# Patient Record
Sex: FEMALE | Race: WHITE | NOT HISPANIC OR LATINO | Employment: FULL TIME | URBAN - METROPOLITAN AREA
[De-identification: names, ages, dates, MRNs, and addresses within clinical notes are randomized per-mention and may not be internally consistent; named-entity substitution may affect disease eponyms.]

---

## 2019-01-14 ENCOUNTER — HOSPITAL ENCOUNTER (OUTPATIENT)
Dept: URGENT CARE | Facility: CLINIC | Age: 29
Discharge: HOME | End: 2019-01-14

## 2019-01-16 ENCOUNTER — CONVERSION ENCOUNTER (OUTPATIENT)
Dept: PODIATRY | Facility: CLINIC | Age: 29
End: 2019-01-16

## 2019-01-16 ENCOUNTER — OFFICE VISIT CONVERTED (OUTPATIENT)
Dept: PODIATRY | Facility: CLINIC | Age: 29
End: 2019-01-16
Attending: PODIATRIST

## 2019-08-03 ENCOUNTER — HOSPITAL ENCOUNTER (OUTPATIENT)
Dept: URGENT CARE | Facility: CLINIC | Age: 29
Discharge: HOME | End: 2019-08-03

## 2019-08-05 LAB — BACTERIA SPEC AEROBE CULT: NORMAL

## 2019-08-06 ENCOUNTER — HOSPITAL ENCOUNTER (OUTPATIENT)
Dept: PERIOP | Facility: HOSPITAL | Age: 29
Setting detail: HOSPITAL OUTPATIENT SURGERY
Discharge: HOME | End: 2019-08-06
Attending: OBSTETRICS & GYNECOLOGY

## 2019-08-06 LAB — HCG SERPL-SCNC: NEGATIVE MMOL/L

## 2020-04-02 ENCOUNTER — CONVERSION ENCOUNTER (OUTPATIENT)
Dept: FAMILY MEDICINE CLINIC | Facility: CLINIC | Age: 30
End: 2020-04-02

## 2020-04-02 ENCOUNTER — OFFICE VISIT CONVERTED (OUTPATIENT)
Dept: FAMILY MEDICINE CLINIC | Facility: CLINIC | Age: 30
End: 2020-04-02
Attending: NURSE PRACTITIONER

## 2020-04-16 ENCOUNTER — TELEPHONE CONVERTED (OUTPATIENT)
Dept: FAMILY MEDICINE CLINIC | Facility: CLINIC | Age: 30
End: 2020-04-16
Attending: NURSE PRACTITIONER

## 2020-04-27 ENCOUNTER — HOSPITAL ENCOUNTER (OUTPATIENT)
Dept: URGENT CARE | Facility: CLINIC | Age: 30
Discharge: HOME | End: 2020-04-27

## 2020-04-29 LAB — SARS-COV-2 RNA SPEC QL NAA+PROBE: NOT DETECTED

## 2020-07-14 ENCOUNTER — OFFICE VISIT CONVERTED (OUTPATIENT)
Dept: PODIATRY | Facility: CLINIC | Age: 30
End: 2020-07-14
Attending: PODIATRIST

## 2020-12-09 ENCOUNTER — HOSPITAL ENCOUNTER (OUTPATIENT)
Dept: URGENT CARE | Facility: CLINIC | Age: 30
Discharge: HOME | End: 2020-12-09
Attending: PHYSICIAN ASSISTANT

## 2020-12-10 LAB — SARS-COV-2 RNA SPEC QL NAA+PROBE: NOT DETECTED

## 2021-03-19 ENCOUNTER — HOSPITAL ENCOUNTER (OUTPATIENT)
Dept: GENERAL RADIOLOGY | Facility: HOSPITAL | Age: 31
Discharge: HOME | End: 2021-03-19
Attending: OBSTETRICS & GYNECOLOGY

## 2021-04-16 ENCOUNTER — HOSPITAL ENCOUNTER (OUTPATIENT)
Dept: PREADMISSION TESTING | Facility: HOSPITAL | Age: 31
Discharge: HOME | End: 2021-04-16
Attending: OBSTETRICS & GYNECOLOGY

## 2021-04-17 LAB — SARS-COV-2 RNA SPEC QL NAA+PROBE: NOT DETECTED

## 2021-05-09 VITALS
SYSTOLIC BLOOD PRESSURE: 120 MMHG | HEIGHT: 62 IN | DIASTOLIC BLOOD PRESSURE: 88 MMHG | OXYGEN SATURATION: 99 % | WEIGHT: 209 LBS | TEMPERATURE: 98 F | HEART RATE: 68 BPM | BODY MASS INDEX: 38.46 KG/M2

## 2021-05-13 NOTE — PROGRESS NOTES
Progress Note      Patient Name: Kristen Orellana   Patient ID: [id number]   Sex: Female   YOB: 1990    Primary Care Provider: Michael Olguin MD   Referring Provider: Johnson Jansen DPM    Visit Date: July 14, 2020    Provider: Johnson Jansen DPM   Location: [facility]   Location Address: [address] [place] [address]  [id number]   Location Phone: [phone]          Chief Complaint  · Right Foot Pain      History Of Present Illness  Kristen Orellana is a 28 year old /White female who presents to the [facility] today for f/u who is self-referred for right foot pain. The patient does not recall any specific injury to the foot. She works at [employer] and is on her feet a great deal. She has used ice and ibuprofen with some relief.      New, Established, New Problem:  est  Location:  Right heel  Duration: Two months     Onset:  Gradual  Nature:  Achy, sharp, shooting  Stable, worsening, improving:  Worsening, recurring  Aggravating factors:  Patient describes morning left heel pain as stabbing, burning, or aching. This pain usually subsides throughout the day, however it returns after periods of rest and sitting, when standing back up on their feet, and again the next morning.    Previous Treatment:  Ice and ibuprofen, cortisone injection    Patient denies any fevers, chills, nausea, vomiting, nor any other constitutional signs nor symptoms.       Past Medical History  Athletes foot; Foot pain, right; Plantar fascial fibromatosis of right foot         Past Surgical History  Deviated nasal septum repair         Medication List  phentermine 15 mg oral capsule         Allergy List  NO KNOWN DRUG ALLERGIES       Allergies Reconciled  Family Medical History  Stroke; Colon Cancer; Lung cancer         Social History  Alcohol (Current some day); Tobacco (Current every day)         Review of Systems  · Constitutional  o Denies  o :  "fever, chills, additional constitutional symptoms except as noted in the HPI  · Cardiovascular  o Denies  o : chest pain, irregular heart beats  · Gastrointestinal  o Denies  o : nausea, vomiting  · Integument  o Denies  o : hair growth change, new skin lesions  · Neurologic  o Denies  o : altered mental status, seizures  · Musculoskeletal  o Admits  o : joint pain, pain with shoe gear, pain with ambulation      Vitals  Date Time BP Position Site L\R Cuff Size HR RR TEMP (F) WT  HT  BMI kg/m2 BSA m2 O2 Sat        07/14/2020 10:20 /95 Sitting    95 - R  97.8 214lbs 4oz 5'  2\" 39.19 2.06 98 %          Physical Examination  · Constitutional  o Appearance  o : Awake, alert, well developed, well nourished and well groomed  · Cardiovascular  o Peripheral Vascular System  o :   § Pedal Pulses  § : Pedal Pulses are +2 and symmetrical   § Extremities  § : No edema of the lower extremities  · Skin and Subcutaneous Tissue  o General Inspection  o : Skin is noted to have normal texture and turgor, with no excresences noted.  o Digits and Nails  o : The tonails are noted to be without disease.  · Neurologic  o Sensation  o : Epicritic sensations intact bilaterally.  · Right Ankle/Foot  o Inspection  o : Positive tenderness to palpaiton to the medial tubercle of the calcaneus. No signs of edema, erythema, lymphangitis, nor signs of infection.   · Injection Note/Aspiration Note  o Site  o : Right heel   o Procedure  o : This patient presents for a trigger point injection. I have discussed the nature, risks, benefits, alternatives and limitations of this procedure with this patient and obtained informed consent. The area was sterilely prepped with isopropyl alcohol. A 27 gauge, 1.25 inch needle was inserted into the affected area. A sterile dressing was applied to the area.  o Medication  o : 0.5ml of 1% lidocaine plain, 0.5ml of 40mg/ml Kenalog, and 0.5ml of 4mg/ml Dexamethasone   o Disposition  o : The patient tolerated " the procedure well                Assessment  · Foot pain, right     729.5/M79.671  · Plantar fascial fibromatosis of right foot     728.71/M72.2      Plan  · Orders  o Decadron 4 mg/1cc Injection () - 729.5/M79.671, 728.71/M72.2 - 07/14/2020   Injection - Dexamethasone 4mg/mL; Dose: 2 mg; Site: Not Entered; Route: subcutaneous; Date: 07/14/2020 10:39:28; Exp: 01/31/2021; Lot: [id number]; Mfg: [employer]; TradeName: dexamethasone sodium phosphate; Location: [facility]; Administered By: Johnson Jansen DPM; Comment: ndc 89234-174-97 inj site right foot  o 2.00 - Kenalog Injection 20mg (-3) - 729.5/M79.671, 728.71/M72.2 - 07/14/2020   Injection - Kenalog 20 mg; Dose: 20mg; Site: Not Entered; Route: subcutaneous; Date: 07/14/2020 10:40:10; Exp: 11/30/2021; Lot: [id number]; Mfg: [employer].; TradeName: triamcinolone acetonide; Location: [facility]; Administered By: Johnson Jansen DPM; Comment: ndc 54334-158-52 inj site right foot  o Inj Tendon Sheath Or Ligament (30207) - 729.5/M79.671, 728.71/M72.2 - 07/14/2020  o ACO-16: BMI above normal range and follow-up plan is documented (, ) - - 07/14/2020  o ACO-17: Screened for tobacco use AND received tobacco cessation intervention (4004F, 4004F) - - 07/14/2020  · Medications  o Medications have been Reconciled  o Transition of Care or Provider Policy  · Instructions  o Handouts provided regarding Planter Fascitis.  o Overview: This patient has a plantar fasciitis.  o Discuss Findings: I have discussed the findings of this evaluation with the patient. The discussion included a complete verbal explanation of any changes in the examination results, diagnosis, and the current treatment plan. A schedule for future care needs was explained. If any questions should arise after returning home, I have encouraged the patient to feel free to contact Dr. Jansen. The patient states understanding and agreement with  this plan.  o Monitor For Problems: Pt to monitor for problems and to contact Dr. Jansen for follow-up should such signs occur. Patient states understanding and agreement with this plan.  o Spenco: Recommend OTC Spenco Orthotics.  o Treatement Alternatives: Nonsurgical treatments almost always improve the pain. Treatment can last from several months to 2 years before symptoms get better. Most patients feel better in 9 months. Rarely Some people need surgery to relieve the pain.  o Conservative Treatment Recommendations: Anti-inflammatory medication to reduce pain and inflammation, Heel stretching exercises,   o Patient request PRN follow-up.  o Patient may begin to weight bear as tolerated in supportive shoes. No impact activities for two weeks. After that time, the patient may increase activities as tolerated. Patient states understanding and agreement with this plan.   o Tobacco Cessation Counseling: Encouraged to stop smoking.   o BMI Counseling: Discussed weight loss and the need for exercise.   o Encouraged to follow-up with Primary Care Provider for preventative care.   o Discuss Findings: I have discussed the findings of this evaluation with the patient. The discussion included a complete verbal explanation of any changes in the examination results, diagnosis, and the current treatment plan. A schedule for future care needs was explained. If any questions should arise after returning home, I have encouraged the patient to feel free to contact Dr. Jansen. The patient states understanding and agreement with this plan.  o Patient is to monitor for problems and to contact Dr. Jansen for follow-up should such signs occur. Patient states understanding and agreement with this plan.   o Electronically Identified Patient Education Materials Provided Electronically  · Disposition  o Call or Return if symptoms worsen or persist.            Electronically Signed by: Johnson Jansen DPM -Author on July 14, 2020  11:15:12 AM

## 2021-05-15 VITALS
DIASTOLIC BLOOD PRESSURE: 95 MMHG | OXYGEN SATURATION: 98 % | WEIGHT: 214.25 LBS | HEART RATE: 95 BPM | SYSTOLIC BLOOD PRESSURE: 123 MMHG | BODY MASS INDEX: 39.43 KG/M2 | HEIGHT: 62 IN | TEMPERATURE: 97.8 F

## 2021-05-15 VITALS
HEIGHT: 62 IN | SYSTOLIC BLOOD PRESSURE: 120 MMHG | DIASTOLIC BLOOD PRESSURE: 85 MMHG | WEIGHT: 205 LBS | BODY MASS INDEX: 37.73 KG/M2 | OXYGEN SATURATION: 96 % | HEART RATE: 74 BPM

## 2021-11-19 ENCOUNTER — OFFICE VISIT (OUTPATIENT)
Dept: PODIATRY | Facility: CLINIC | Age: 31
End: 2021-11-19

## 2021-11-19 VITALS
BODY MASS INDEX: 39.75 KG/M2 | TEMPERATURE: 97.6 F | OXYGEN SATURATION: 96 % | HEIGHT: 62 IN | DIASTOLIC BLOOD PRESSURE: 70 MMHG | WEIGHT: 216 LBS | SYSTOLIC BLOOD PRESSURE: 110 MMHG | HEART RATE: 79 BPM

## 2021-11-19 DIAGNOSIS — M79.671 FOOT PAIN, BILATERAL: Primary | ICD-10-CM

## 2021-11-19 DIAGNOSIS — M79.672 FOOT PAIN, BILATERAL: Primary | ICD-10-CM

## 2021-11-19 DIAGNOSIS — M72.2 PLANTAR FASCIITIS: ICD-10-CM

## 2021-11-19 PROCEDURE — 99213 OFFICE O/P EST LOW 20 MIN: CPT | Performed by: PODIATRIST

## 2021-11-19 PROCEDURE — 20550 NJX 1 TENDON SHEATH/LIGAMENT: CPT | Performed by: PODIATRIST

## 2021-11-19 RX ORDER — TRIAMCINOLONE ACETONIDE 40 MG/ML
20 INJECTION, SUSPENSION INTRA-ARTICULAR; INTRAMUSCULAR ONCE
Status: DISCONTINUED | OUTPATIENT
Start: 2021-11-19 | End: 2022-04-19

## 2021-11-19 RX ORDER — TRIAMCINOLONE ACETONIDE 40 MG/ML
20 INJECTION, SUSPENSION INTRA-ARTICULAR; INTRAMUSCULAR ONCE
Status: COMPLETED | OUTPATIENT
Start: 2021-11-19 | End: 2021-11-19

## 2021-11-19 RX ORDER — LIDOCAINE HYDROCHLORIDE 10 MG/ML
0.5 INJECTION, SOLUTION INFILTRATION; PERINEURAL ONCE
Status: DISCONTINUED | OUTPATIENT
Start: 2021-11-19 | End: 2022-04-19

## 2021-11-19 RX ORDER — DEXAMETHASONE SODIUM PHOSPHATE 4 MG/ML
2 INJECTION, SOLUTION INTRA-ARTICULAR; INTRALESIONAL; INTRAMUSCULAR; INTRAVENOUS; SOFT TISSUE ONCE
Status: DISCONTINUED | OUTPATIENT
Start: 2021-11-19 | End: 2022-04-19

## 2021-11-19 RX ORDER — DEXAMETHASONE SODIUM PHOSPHATE 4 MG/ML
2 INJECTION, SOLUTION INTRA-ARTICULAR; INTRALESIONAL; INTRAMUSCULAR; INTRAVENOUS; SOFT TISSUE ONCE
Status: COMPLETED | OUTPATIENT
Start: 2021-11-19 | End: 2021-11-19

## 2021-11-19 RX ORDER — LIDOCAINE HYDROCHLORIDE 10 MG/ML
0.5 INJECTION, SOLUTION INFILTRATION; PERINEURAL ONCE
Status: COMPLETED | OUTPATIENT
Start: 2021-11-19 | End: 2021-11-19

## 2021-11-19 RX ADMIN — LIDOCAINE HYDROCHLORIDE 0.5 ML: 10 INJECTION, SOLUTION INFILTRATION; PERINEURAL at 09:24

## 2021-11-19 RX ADMIN — TRIAMCINOLONE ACETONIDE 20 MG: 40 INJECTION, SUSPENSION INTRA-ARTICULAR; INTRAMUSCULAR at 09:22

## 2021-11-19 RX ADMIN — DEXAMETHASONE SODIUM PHOSPHATE 2 MG: 4 INJECTION, SOLUTION INTRA-ARTICULAR; INTRALESIONAL; INTRAMUSCULAR; INTRAVENOUS; SOFT TISSUE at 09:21

## 2021-11-19 RX ADMIN — DEXAMETHASONE SODIUM PHOSPHATE 2 MG: 4 INJECTION, SOLUTION INTRA-ARTICULAR; INTRALESIONAL; INTRAMUSCULAR; INTRAVENOUS; SOFT TISSUE at 09:22

## 2021-11-19 RX ADMIN — TRIAMCINOLONE ACETONIDE 20 MG: 40 INJECTION, SUSPENSION INTRA-ARTICULAR; INTRAMUSCULAR at 09:23

## 2021-11-19 NOTE — PROGRESS NOTES
[facility] - P[facility]IATRY    Today's Date: 21    Patient Name: Kristen Orellana  MRN: [id number]  CSN: [id number]  PCP: Provider, No Known  Referring Provider: Referring, Self    SUBJECTIVE     Chief Complaint   Patient presents with   • Left Foot - Pain   • Right Foot - Pain     HPI: Kristen Orellana, a 31 y.o.female, comes to clinic.    New, Established, New Problem: Established on right heel and new problem left heel    Location: Plantar heel bilaterally    Duration: Summer 2021    Onset:  gradual    Nature:  achy, sharp, shooting    Stable, worsening, improving: Worsening    Aggravating factors:      Patient describes morning bilateral heel pain as stabbing, burning, or aching. This pain usually subsides throughout the day, however it returns after periods of rest and sitting, when standing back up on their feet, and again the next morning.      Previous Treatment: Previous cortisone injections on right heel    Patient denies any fevers, chills, nausea, vomiting, shortness of breath, nor any other constitutional signs nor symptoms.    No other pedal complaints at this time.    Past Medical History:   Diagnosis Date   • Foot pain, bilateral      Past Surgical History:   Procedure Laterality Date   •  SECTION     • ENDOMETRIAL ABLATION     • HYSTERECTOMY     • NOSE SURGERY       Family History   Problem Relation Age of Onset   • Cancer Mother         breast   • Stroke Maternal Grandmother    • Cancer Maternal Grandmother         lung, breast   • Cancer Maternal Grandfather         lung   • Diabetes Paternal Grandmother    • Stroke Paternal Grandmother    • Cancer Paternal Grandmother         colon   • Stroke Paternal Grandfather    • Heart disease Neg Hx      Social History     Socioeconomic History   • Marital status:    Tobacco Use   • Smoking status: Current Every Day Smoker     Packs/day: 0.50     Types: Cigarettes   • Smokeless tobacco: Never Used   Vaping Use   •  Vaping Use: Never used   Substance and Sexual Activity   • Alcohol use: Yes     Comment: occas   • Drug use: Never   • Sexual activity: Defer     Allergies   Allergen Reactions   • Norgestimate-Eth Estradiol Hives     FEVER, THROAT SWELL     No current outpatient medications on file.     Current Facility-Administered Medications   Medication Dose Route Frequency Provider Last Rate Last Admin   • dexamethasone sodium phosphate injection 2 mg  2 mg Intramuscular Once Johnson Jansen DPM       • dexamethasone sodium phosphate injection 2 mg  2 mg Intramuscular Once Jhonson Jansen DPM       • dexamethasone sodium phosphate injection 2 mg  2 mg Intramuscular Once Johnson Jansen DPM       • dexamethasone sodium phosphate injection 2 mg  2 mg Intramuscular Once Johnson Jansen DPM       • lidocaine (XYLOCAINE) 1 % injection 0.5 mL  0.5 mL Infiltration Once Johnson Jansen DPM       • lidocaine (XYLOCAINE) 1 % injection 0.5 mL  0.5 mL Infiltration Once Johnson Jansen DPM       • lidocaine (XYLOCAINE) 1 % injection 0.5 mL  0.5 mL Infiltration Once Johnson Jansen DPM       • lidocaine (XYLOCAINE) 1 % injection 0.5 mL  0.5 mL Infiltration Once Johnson Jansen DPM       • triamcinolone acetonide (KENALOG-40) injection 20 mg  20 mg Intramuscular Once Johnson Jansen DPM       • triamcinolone acetonide (KENALOG-40) injection 20 mg  20 mg Intramuscular Once Johnson Jansen DPM       • triamcinolone acetonide (KENALOG-40) injection 20 mg  20 mg Intramuscular Once Johnson Jansen DPM       • triamcinolone acetonide (KENALOG-40) injection 20 mg  20 mg Intramuscular Once Johnson Jansen DPM         Review of Systems   Constitutional: Negative.    Musculoskeletal:        Bilateral heel pain   All other systems reviewed and are negative.      OBJECTIVE     Vitals:    11/19/21 0801   BP: 110/70   Pulse: 79   Temp: 97.6 °F (36.4 °C)   SpO2:  96%       PHYSICAL EXAM     Foot/Ankle Exam:       General:   Appearance: obesity    Orientation: AAOx3    Affect: appropriate    Gait: unimpaired      VASCULAR      Right Foot Vascularity   Normal vascular exam    Dorsalis pedis:  2+  Posterior tibial:  2+  Skin Temperature: warm    Edema Grading:  None  CFT:  < 3 seconds  Pedal Hair Growth:  Present  Varicosities: none       Left Foot Vascularity   Normal vascular exam    Dorsalis pedis:  2+  Posterior tibial:  2+  Skin Temperature: warm    Edema Grading:  None  CFT:  < 3 seconds  Pedal Hair Growth:  Present  Varicosities: none        NEUROLOGIC     Right Foot Neurologic   Normal sensation    Light touch sensation:  Normal  Vibratory sensation:  Normal  Hot/Cold sensation: normal       Left Foot Neurologic   Normal sensation    Light touch sensation:  Normal  Vibratory sensation:  Normal  Hot/cold sensation: normal       MUSCULOSKELETAL      Right Foot Musculoskeletal   Tenderness: plantar fascia and plantar heel       Left Foot Musculoskeletal   Tenderness: plantar fascia and plantar heel    Tailor's bunion: Yes       MUSCLE STRENGTH     Right Foot Muscle Strength   Normal strength    Foot dorsiflexion:  5  Foot plantar flexion:  5  Foot inversion:  5  Foot eversion:  5     Left Foot Muscle Strength   Foot dorsiflexion:  5  Foot plantar flexion:  5  Foot inversion:  5  Foot eversion:  5     RANGE OF MOTION      Right Foot Range of Motion   Foot and ankle ROM within normal limits       Left Foot Range of Motion   Foot and ankle ROM within normal limits       DERMATOLOGIC     Right Foot Dermatologic   Skin: skin intact    Nails: normal       Left Foot Dermatologic   Skin: skin intact    Nails: normal        RADIOLOGY:    XR Foot 3+ View Left    Result Date: 11/19/2021  Narrative: IN-OFFICE IMAGING:  3 view, AP, [place], Lateral, left foot Indication: Heel pain Findings: Posterior and inferior calcaneal enthesopathy.  Tailor's bunion.  Anterior cyma line break seen.  No  "periosteal reactions nor osteolytic changes seen.  No occult fractures seen. Comparison: No comparison views available.        ASSESSMENT/PLAN     Diagnoses and all orders for this visit:    1. Foot pain, bilateral (Primary)    2. Plantar fasciitis  -     triamcinolone acetonide (KENALOG-40) injection 20 mg  -     lidocaine (XYLOCAINE) 1 % injection 0.5 mL  -     dexamethasone sodium phosphate injection 2 mg  -     dexamethasone sodium phosphate injection 2 mg  -     lidocaine (XYLOCAINE) 1 % injection 0.5 mL  -     triamcinolone acetonide (KENALOG-40) injection 20 mg    Comprehensive lower extremity examination and evaluation was performed.    Discussed findings and treatment plan including risks, benefits, and treatment options with patient in detail. Patient agreed with treatment plan.    Plantar Fasciits Injection:    Date/Time: 11/19/2021  Performed by: Johnson Jansen DPM  Authorized by: Johnson Jansen DPM     Consent: Verbal consent obtained. Written consent obtained.  Risks and benefits: risks, benefits and alternatives were discussed  Consent given by: patient  Patient identity confirmed: verbally with patient  Indications: pain relief    Injection site: Bilateral heel.    Sedation:  none    Patient position: sitting  Needle size: 27 G, 1 1/4\" in length  Injection medications:  0.5 ml 1% Lidocaine plain, 0.5 ml Kenalog 40 mg/ml, and 0.5 ml Decadron 4 mg/mL.   Outcome: pain improved    Patient tolerated the procedure well with no immediate complications.    Treatment options discussed included: No treatment at all, cortisone injections, NSAIDs, p.o. medications, change in shoe gear, arch supports, RICE therapy.    Dr. Jansen recommended purchase and use of OTC Spenco Orthotics.  The patient states understanding and agreement with this plan.    Discussed proper shoegear for the patient's feet and medical condition.  Patient states understanding and agreement with this plan.    Rice Therapy: It is " important to treat any injury as soon as possible to help control swelling and increase recovery time. The recognized regimen for immediate treatment of sport injuries includes rest, ice (cold application), compression, and elevation (RICE). Remove the injured athlete from play, apply ice to the affected area, wrap or compress the injured area with an elastic bandage when appropriate, and elevate the injured area above heart level to reduce swelling.  The patient is to not use ice for longer than 20 minutes at a time, with at least 20 minutes of no ice usage between applications.  The patient states understanding and agreement with this plan.    Patient may begin to weight bear as tolerated in supportive shoes.  No impact activities for two weeks.  After that time, the patient may increase activities as tolerated. Patient states understanding and agreement with this plan.    An After Visit Summary was printed and given to the patient at discharge, including (if requested) any available informative/educational handouts regarding diagnosis, treatment, or medications. All questions were answered to patient/family satisfaction. Should symptoms fail to improve or worsen they agree to call or return to clinic or to go to the Emergency Department. Discussed the importance of following up with any needed screening tests/labs/specialist appointments and any requested follow-up recommended by me today. Importance of maintaining follow-up discussed and patient accepts that missed appointments can delay diagnosis and potentially lead to worsening of conditions.    Return if symptoms worsen or fail to improve., or sooner if acute issues arise.    This document has been electronically signed by Johnson Jansen DPM on November 19, 2021 09:14 EST

## 2022-04-19 ENCOUNTER — OFFICE VISIT (OUTPATIENT)
Dept: PODIATRY | Facility: CLINIC | Age: 32
End: 2022-04-19

## 2022-04-19 VITALS
BODY MASS INDEX: 40.67 KG/M2 | HEART RATE: 85 BPM | SYSTOLIC BLOOD PRESSURE: 106 MMHG | HEIGHT: 62 IN | OXYGEN SATURATION: 100 % | WEIGHT: 221 LBS | TEMPERATURE: 97.5 F | DIASTOLIC BLOOD PRESSURE: 77 MMHG

## 2022-04-19 DIAGNOSIS — M79.671 FOOT PAIN, BILATERAL: Primary | ICD-10-CM

## 2022-04-19 DIAGNOSIS — M79.672 FOOT PAIN, BILATERAL: Primary | ICD-10-CM

## 2022-04-19 DIAGNOSIS — M72.2 PLANTAR FASCIITIS: ICD-10-CM

## 2022-04-19 PROCEDURE — 20550 NJX 1 TENDON SHEATH/LIGAMENT: CPT | Performed by: PODIATRIST

## 2022-04-19 PROCEDURE — 99213 OFFICE O/P EST LOW 20 MIN: CPT | Performed by: PODIATRIST

## 2022-04-19 RX ORDER — DEXAMETHASONE SODIUM PHOSPHATE 4 MG/ML
2 INJECTION, SOLUTION INTRA-ARTICULAR; INTRALESIONAL; INTRAMUSCULAR; INTRAVENOUS; SOFT TISSUE ONCE
Status: COMPLETED | OUTPATIENT
Start: 2022-04-19 | End: 2022-04-19

## 2022-04-19 RX ORDER — TRIAMCINOLONE ACETONIDE 40 MG/ML
20 INJECTION, SUSPENSION INTRA-ARTICULAR; INTRAMUSCULAR ONCE
Status: COMPLETED | OUTPATIENT
Start: 2022-04-19 | End: 2022-04-19

## 2022-04-19 RX ORDER — LIDOCAINE HYDROCHLORIDE 10 MG/ML
0.5 INJECTION, SOLUTION INFILTRATION; PERINEURAL ONCE
Status: COMPLETED | OUTPATIENT
Start: 2022-04-19 | End: 2022-04-19

## 2022-04-19 RX ADMIN — LIDOCAINE HYDROCHLORIDE 0.5 ML: 10 INJECTION, SOLUTION INFILTRATION; PERINEURAL at 08:08

## 2022-04-19 RX ADMIN — TRIAMCINOLONE ACETONIDE 20 MG: 40 INJECTION, SUSPENSION INTRA-ARTICULAR; INTRAMUSCULAR at 08:06

## 2022-04-19 RX ADMIN — DEXAMETHASONE SODIUM PHOSPHATE 2 MG: 4 INJECTION, SOLUTION INTRA-ARTICULAR; INTRALESIONAL; INTRAMUSCULAR; INTRAVENOUS; SOFT TISSUE at 08:05

## 2022-04-19 RX ADMIN — TRIAMCINOLONE ACETONIDE 20 MG: 40 INJECTION, SUSPENSION INTRA-ARTICULAR; INTRAMUSCULAR at 08:07

## 2022-04-19 RX ADMIN — LIDOCAINE HYDROCHLORIDE 0.5 ML: 10 INJECTION, SOLUTION INFILTRATION; PERINEURAL at 08:07

## 2022-04-19 RX ADMIN — DEXAMETHASONE SODIUM PHOSPHATE 2 MG: 4 INJECTION, SOLUTION INTRA-ARTICULAR; INTRALESIONAL; INTRAMUSCULAR; INTRAVENOUS; SOFT TISSUE at 08:06

## 2022-04-19 NOTE — PROGRESS NOTES
[facility] - PODIATRY    Today's Date: 22    Patient Name: Kristen Orellana  MRN: [id number]  CSN: [id number]  PCP: Provider, No Known  Referring Provider: No ref. provider found    SUBJECTIVE     Chief Complaint   Patient presents with   • Left Foot - Callouses, Pain     Requesting injection  callus is a new problem   • Right Foot - Pain     Requesting injection     HPI: Kristen Orellana, a 32 y.o.female, comes to clinic.    New, Established, New Problem: Established on right heel and new problem left heel    Location: Plantar heel bilaterally    Duration: Summer 2021    Onset:  gradual    Nature:  achy, sharp, shooting    Stable, worsening, improving: Worsening, recurring    Aggravating factors:  Patient describes morning bilateral heel pain as stabbing, burning, or aching. This pain usually subsides throughout the day, however it returns after periods of rest and sitting, when standing back up on their feet, and again the next morning.      Previous Treatment: Previous cortisone injections on bilateral heels    Patient denies any fevers, chills, nausea, vomiting, shortness of breath, nor any other constitutional signs nor symptoms.    No other pedal complaints at this time.    Past Medical History:   Diagnosis Date   • Foot pain, bilateral      Past Surgical History:   Procedure Laterality Date   •  SECTION     • ENDOMETRIAL ABLATION     • HYSTERECTOMY     • NOSE SURGERY       Family History   Problem Relation Age of Onset   • Cancer Mother         breast   • Stroke Maternal Grandmother    • Cancer Maternal Grandmother         lung, breast   • Cancer Maternal Grandfather         lung   • Diabetes Paternal Grandmother    • Stroke Paternal Grandmother    • Cancer Paternal Grandmother         colon   • Stroke Paternal Grandfather    • Heart disease Neg Hx      Social History     Socioeconomic History   • Marital status:    Tobacco Use   • Smoking status: Current Every Day  Smoker     Packs/day: 0.50     Types: Cigarettes   • Smokeless tobacco: Never Used   Vaping Use   • Vaping Use: Never used   Substance and Sexual Activity   • Alcohol use: Yes     Comment: occas   • Drug use: Never   • Sexual activity: Defer     Allergies   Allergen Reactions   • Norgestimate-Eth Estradiol Hives     FEVER, THROAT SWELL     No current outpatient medications on file.     Current Facility-Administered Medications   Medication Dose Route Frequency Provider Last Rate Last Admin   • dexamethasone sodium phosphate injection 2 mg  2 mg Intramuscular Once Johnson Jansen DPM       • dexamethasone sodium phosphate injection 2 mg  2 mg Intramuscular Once Johnson Jansen DPM       • lidocaine (XYLOCAINE) 1 % injection 0.5 mL  0.5 mL Infiltration Once Johnson Jansen DPM       • lidocaine (XYLOCAINE) 1 % injection 0.5 mL  0.5 mL Infiltration Once Johnson Jansen DPM       • triamcinolone acetonide (KENALOG-40) injection 20 mg  20 mg Intramuscular Once Johnson Jansen DPM       • triamcinolone acetonide (KENALOG-40) injection 20 mg  20 mg Intramuscular Once Johnson Jansen DPM         Review of Systems   Constitutional: Negative.    Musculoskeletal:        Bilateral heel pain   All other systems reviewed and are negative.      OBJECTIVE     Vitals:    04/19/22 0731   BP: 106/77   Pulse: 85   Temp: 97.5 °F (36.4 °C)   SpO2: 100%       PHYSICAL EXAM     Foot/Ankle Exam:       General:   Appearance: obesity    Orientation: AAOx3    Affect: appropriate    Gait: unimpaired      VASCULAR      Right Foot Vascularity   Normal vascular exam    Dorsalis pedis:  2+  Posterior tibial:  2+  Skin Temperature: warm    Edema Grading:  None  CFT:  < 3 seconds  Pedal Hair Growth:  Present  Varicosities: none       Left Foot Vascularity   Normal vascular exam    Dorsalis pedis:  2+  Posterior tibial:  2+  Skin Temperature: warm    Edema Grading:  None  CFT:  < 3 seconds  Pedal Hair  Growth:  Present  Varicosities: none        NEUROLOGIC     Right Foot Neurologic   Normal sensation    Light touch sensation:  Normal  Vibratory sensation:  Normal  Hot/Cold sensation: normal       Left Foot Neurologic   Normal sensation    Light touch sensation:  Normal  Vibratory sensation:  Normal  Hot/cold sensation: normal       MUSCULOSKELETAL      Right Foot Musculoskeletal   Tenderness: plantar fascia and plantar heel       Left Foot Musculoskeletal   Tenderness: plantar fascia and plantar heel    Tailor's bunion: Yes       MUSCLE STRENGTH     Right Foot Muscle Strength   Normal strength    Foot dorsiflexion:  5  Foot plantar flexion:  5  Foot inversion:  5  Foot eversion:  5     Left Foot Muscle Strength   Foot dorsiflexion:  5  Foot plantar flexion:  5  Foot inversion:  5  Foot eversion:  5     RANGE OF MOTION      Right Foot Range of Motion   Foot and ankle ROM within normal limits       Left Foot Range of Motion   Foot and ankle ROM within normal limits       DERMATOLOGIC     Right Foot Dermatologic   Skin: skin intact    Nails: normal       Left Foot Dermatologic   Skin: skin intact    Nails: normal          ASSESSMENT/PLAN     Diagnoses and all orders for this visit:    1. Foot pain, bilateral (Primary)    2. Plantar fasciitis  -     triamcinolone acetonide (KENALOG-40) injection 20 mg  -     lidocaine (XYLOCAINE) 1 % injection 0.5 mL  -     dexamethasone sodium phosphate injection 2 mg  -     dexamethasone sodium phosphate injection 2 mg  -     lidocaine (XYLOCAINE) 1 % injection 0.5 mL  -     triamcinolone acetonide (KENALOG-40) injection 20 mg    Comprehensive lower extremity examination and evaluation was performed.    Discussed findings and treatment plan including risks, benefits, and treatment options with patient in detail. Patient agreed with treatment plan.    Plantar Fasciits Injection:    Date/Time: April 19, 2022  Performed by: Johnson Jansen DPM  Authorized by: Johnson Jansen  "[email or web address]     Consent: Verbal consent obtained. Written consent obtained.  Risks and benefits: risks, benefits and alternatives were discussed  Consent given by: patient  Patient identity confirmed: verbally with patient  Indications: pain relief    Injection site: Bilateral heel.    Sedation:  none    Patient position: sitting  Needle size: 27 G, 1 1/4\" in length  Injection medications:  0.5 ml 1% Lidocaine plain, 0.5 ml Kenalog 40 mg/ml, and 0.5 ml Decadron 4 mg/mL.   Outcome: pain improved    Patient tolerated the procedure well with no immediate complications.    Treatment Options discussed:  - no treatment at all  - change in shoegear  - change in activities  - RICE therapy  - arch support  - NSAIDs  - PO steroids  - injectable steroids    Patient may begin to weight bear as tolerated in supportive shoes.  No impact activities for two weeks.  After that time, the patient may increase activities as tolerated. Patient states understanding and agreement with this plan.    An After Visit Summary was printed and given to the patient at discharge, including (if requested) any available informative/educational handouts regarding diagnosis, treatment, or medications. All questions were answered to patient/family satisfaction. Should symptoms fail to improve or worsen they agree to call or return to clinic or to go to the Emergency Department. Discussed the importance of following up with any needed screening tests/labs/specialist appointments and any requested follow-up recommended by me today. Importance of maintaining follow-up discussed and patient accepts that missed appointments can delay diagnosis and potentially lead to worsening of conditions.    Return if symptoms worsen or fail to improve., or sooner if acute issues arise.    This document has been electronically signed by Johnson Jansen DPM on April 19, 2022 08:01 EDT       "

## 2022-07-06 ENCOUNTER — OFFICE VISIT (OUTPATIENT)
Dept: PODIATRY | Facility: CLINIC | Age: 32
End: 2022-07-06

## 2022-07-06 VITALS
HEART RATE: 71 BPM | SYSTOLIC BLOOD PRESSURE: 121 MMHG | TEMPERATURE: 97.6 F | WEIGHT: 222 LBS | BODY MASS INDEX: 40.85 KG/M2 | OXYGEN SATURATION: 98 % | DIASTOLIC BLOOD PRESSURE: 61 MMHG | HEIGHT: 62 IN

## 2022-07-06 DIAGNOSIS — M79.671 FOOT PAIN, RIGHT: Primary | ICD-10-CM

## 2022-07-06 DIAGNOSIS — S82.891A CLOSED FRACTURE OF RIGHT ANKLE, INITIAL ENCOUNTER: ICD-10-CM

## 2022-07-06 DIAGNOSIS — S93.401A SPRAIN OF RIGHT ANKLE, UNSPECIFIED LIGAMENT, INITIAL ENCOUNTER: ICD-10-CM

## 2022-07-06 PROCEDURE — 99213 OFFICE O/P EST LOW 20 MIN: CPT | Performed by: PODIATRIST

## 2022-07-06 RX ORDER — IBUPROFEN 400 MG/1
400 TABLET ORAL EVERY 8 HOURS PRN
COMMUNITY

## 2022-07-20 ENCOUNTER — HOSPITAL ENCOUNTER (OUTPATIENT)
Dept: MRI IMAGING | Facility: HOSPITAL | Age: 32
Discharge: HOME | End: 2022-07-20
Admitting: PODIATRIST

## 2022-07-20 ENCOUNTER — APPOINTMENT (OUTPATIENT)
Dept: MRI IMAGING | Facility: HOSPITAL | Age: 32
End: 2022-07-20

## 2022-07-20 ENCOUNTER — TELEPHONE (OUTPATIENT)
Dept: PODIATRY | Facility: CLINIC | Age: 32
End: 2022-07-20

## 2022-07-20 DIAGNOSIS — S82.891A CLOSED FRACTURE OF RIGHT ANKLE, INITIAL ENCOUNTER: ICD-10-CM

## 2022-07-20 PROCEDURE — 73721 MRI JNT OF LWR EXTRE W/O DYE: CPT

## 2022-07-20 NOTE — TELEPHONE ENCOUNTER
Provider: DR TRAVIS    Caller: ALEKSANDR MORGAN    Relationship to Patient: SELF    Phone Number: [phone]    Reason for Call: PT REPORTS THAT THE BOOT DR TRAVIS HAS HER WEARING STOPPED HOLDING AIR ON THE OUTSIDE POCKET TWO DAYS AGO, WANTS TO KNOW WHAT SHE NEEDS TO DO.

## 2022-07-29 ENCOUNTER — OFFICE VISIT (OUTPATIENT)
Dept: PODIATRY | Facility: CLINIC | Age: 32
End: 2022-07-29

## 2022-07-29 VITALS
SYSTOLIC BLOOD PRESSURE: 147 MMHG | TEMPERATURE: 97.3 F | HEIGHT: 62 IN | WEIGHT: 225 LBS | BODY MASS INDEX: 41.41 KG/M2 | OXYGEN SATURATION: 100 % | HEART RATE: 86 BPM | DIASTOLIC BLOOD PRESSURE: 73 MMHG

## 2022-07-29 DIAGNOSIS — M79.671 FOOT PAIN, RIGHT: Primary | ICD-10-CM

## 2022-07-29 DIAGNOSIS — M72.2 PLANTAR FASCIITIS: ICD-10-CM

## 2022-07-29 DIAGNOSIS — S93.401A SPRAIN OF RIGHT ANKLE, UNSPECIFIED LIGAMENT, INITIAL ENCOUNTER: ICD-10-CM

## 2022-07-29 PROCEDURE — 99213 OFFICE O/P EST LOW 20 MIN: CPT | Performed by: PODIATRIST

## 2022-07-29 NOTE — PROGRESS NOTES
[facility] - PODIATRY    Today's Date: 22    Patient Name: Kristen Orellana  MRN: [id number]  CSN: [id number]  PCP: Michael Olguin MD  Referring Provider: No ref. provider found    SUBJECTIVE     Chief Complaint   Patient presents with   • Right Ankle - Follow-up, Results     MRI follow up      HPI: Kristen Orellana, a 32 y.o.female, presents to clinic.    New, Established, New Problem: Established  Location: Right ankle    Duration: Early 2022    Onset: Acute    Nature: Fell at home from approximately 8 feet high ladder    Stable, worsening, improving: Significant improvement since last visit    Aggravating factors:  Patient relates pain is aggravated by shoe gear and ambulation.      Previous Treatment: NSAIDs, RICE therapy    Patient denies any fevers, chills, nausea, vomiting, shortness of breath, nor any other constitutional signs nor symptoms.    No other pedal complaints at this time.        Past Medical History:   Diagnosis Date   • Ankle pain, right    • Foot pain, bilateral      Past Surgical History:   Procedure Laterality Date   •  SECTION     • ENDOMETRIAL ABLATION     • HYSTERECTOMY     • NOSE SURGERY       Family History   Problem Relation Age of Onset   • Cancer Mother         breast   • Stroke Maternal Grandmother    • Cancer Maternal Grandmother         lung, breast   • Cancer Maternal Grandfather         lung   • Diabetes Paternal Grandmother    • Stroke Paternal Grandmother    • Cancer Paternal Grandmother         colon   • Stroke Paternal Grandfather    • Heart disease Neg Hx      Social History     Socioeconomic History   • Marital status:    Tobacco Use   • Smoking status: Current Every Day Smoker     Packs/day: 0.50     Types: Cigarettes   • Smokeless tobacco: Never Used   Vaping Use   • Vaping Use: Never used   Substance and Sexual Activity   • Alcohol use: Yes     Comment: occas   • Drug use: Never   • Sexual activity: Defer     Allergies    Allergen Reactions   • Norgestimate-Eth Estradiol Hives     FEVER, THROAT SWELL     Current Outpatient Medications   Medication Sig Dispense Refill   • ibuprofen (ADVIL,MOTRIN) 400 MG tablet Take 400 mg by mouth Every 8 (Eight) Hours As Needed for Mild Pain .       No current facility-administered medications for this visit.     Review of Systems   Constitutional: Negative.    Musculoskeletal:        Right ankle   All other systems reviewed and are negative.      OBJECTIVE     Vitals:    07/29/22 0701   BP: 147/73   Pulse: 86   Temp: 97.3 °F (36.3 °C)   SpO2: 100%       WBC   Date Value Ref Range Status   04/21/2021 7.12 4.80 - 10.80 10*3/uL Final     RBC   Date Value Ref Range Status   04/21/2021 4.96 4.20 - 5.40 10*6/uL Final     Hemoglobin   Date Value Ref Range Status   04/21/2021 14.0 12.0 - 16.0 g/dL Final     Hematocrit   Date Value Ref Range Status   04/21/2021 42.1 37.0 - 47.0 % Final     MCV   Date Value Ref Range Status   04/21/2021 87.1 81.0 - 99.0 fL Final     MCH   Date Value Ref Range Status   04/21/2021 29.0 27.0 - 31.0 pg Final     MCHC   Date Value Ref Range Status   04/21/2021 33.3 33.0 - 37.0 Final     RDW   Date Value Ref Range Status   04/21/2021 13.2 11.7 - 14.4 % Final     RDW-SD   Date Value Ref Range Status   04/21/2021 42.4 36.4 - 46.3 fL Final     MPV   Date Value Ref Range Status   04/21/2021 12.0 9.4 - 12.3 fL Final     Platelets   Date Value Ref Range Status   04/21/2021 118 (L) 130 - 400 10*3/uL Final     Neutrophil Rel %   Date Value Ref Range Status   04/21/2021 47.3 30.0 - 85.0 % Final     Lymphocyte Rel %   Date Value Ref Range Status   04/21/2021 35.5 20.0 - 45.0 % Final     Monocyte Rel %   Date Value Ref Range Status   04/21/2021 8.8 3.0 - 10.0 % Final     Eosinophil Rel %   Date Value Ref Range Status   04/21/2021 7.9 (H) 0.0 - 7.0 % Final     Basophil Rel %   Date Value Ref Range Status   04/21/2021 0.4 0.0 - 3.0 % Final     Neutrophils Absolute   Date Value Ref Range  Status   04/21/2021 3.36 2.00 - 8.00 10*3/uL Final     Lymphocytes Absolute   Date Value Ref Range Status   04/21/2021 2.53 1.00 - 5.00 10*3/uL Final     Monocytes Absolute   Date Value Ref Range Status   04/21/2021 0.63 0.20 - 1.20 10*3/uL Final     Eosinophils Absolute   Date Value Ref Range Status   04/21/2021 0.56 0.00 - 0.70 10*3/uL Final     Basophils Absolute   Date Value Ref Range Status   04/21/2021 0.03 0.00 - 0.20 10*3/uL Final     NRBC   Date Value Ref Range Status   04/21/2021 0.00 0.00 - 0.70 % Final         Lab Results   Component Value Date    GLUCOSE 74 11/05/2019    BUN 11 11/05/2019    CREATININE 0.68 11/05/2019    BCR 16 11/05/2019    K 4.2 11/05/2019    CO2 26 11/05/2019    CALCIUM 9.4 11/05/2019    ALBUMIN 3.9 11/05/2019    LABIL2 1.4 11/05/2019    AST 22 11/05/2019    ALT 29 11/05/2019       Patient seen in no apparent distress.      PHYSICAL EXAM:     Foot/Ankle Exam:       General:   Appearance: obesity    Orientation: AAOx3    Affect: appropriate    Gait: unimpaired    Shoes: CAM Walker on right lower leg.    VASCULAR      Right Foot Vascularity   Normal vascular exam    Dorsalis pedis:  2+  Posterior tibial:  2+  Skin Temperature: warm    Edema Grading:  None  CFT:  < 3 seconds  Pedal Hair Growth:  Present  Varicosities: mild varicosities       Left Foot Vascularity   Normal vascular exam    Dorsalis pedis:  2+  Posterior tibial:  2+  Skin Temperature: warm    Edema Grading:  None  CFT:  < 3 seconds  Pedal Hair Growth:  Present  Varicosities: mild varicosities        NEUROLOGIC     Right Foot Neurologic   Normal sensation    Light touch sensation:  Normal  Vibratory sensation:  Normal  Hot/Cold sensation: normal    Protective Sensation using Eads-Ana Monofilament:  10     Left Foot Neurologic   Normal sensation    Light touch sensation:  Normal  Vibratory sensation:  Normal  Hot/cold sensation: normal    Protective Sensation using Eads-Ana Monofilament:  10     MUSCLE  STRENGTH     Right Foot Muscle Strength   Foot dorsiflexion:  4  Foot plantar flexion:  4  Foot inversion:  4  Foot eversion:  4     Left Foot Muscle Strength   Foot dorsiflexion:  4  Foot plantar flexion:  4  Foot inversion:  4  Foot eversion:  4     RANGE OF MOTION      Right Foot Range of Motion   Foot and ankle ROM within normal limits       Left Foot Range of Motion   Foot and ankle ROM within normal limits       DERMATOLOGIC     Right Foot Dermatologic   Skin: skin intact    Nails: normal       Left Foot Dermatologic   Skin: skin intact    Nails: normal        RADIOLOGY:      XR Ankle 3+ View Right    Result Date: 7/6/2022  Narrative: IN-OFFICE IMAGING:  Standing, weightbearing, 3 view, AP, MO, Lateral, right ankle Indication: Fell from a ladder in June 2022 Findings: No periosteal reactions nor osteolytic changes seen.  No occult fractures seen.  Posterior and inferior calcaneal enthesopathy.  Anterior cyma line break seen. Comparison: No comparison views available.     MRI Ankle Right Without Contrast    Result Date: 7/20/2022  Narrative: PROCEDURE: MRI ANKLE RIGHT WO CONTRAST  COMPARISON: [facility], CR, XR ANKLE 3+ VW RIGHT, 7/06/2022, 14:52.  INDICATIONS: LATERAL RIGHT ANKLE PAIN AND SWELLING AFTER FALL DOWN STEPS 1 MONTH AGO.     TECHNIQUE: A complete multi-planar examination was performed without contrast.  FINDINGS:  No fracture or malalignment is identified.  Mild edema is noted in the proximal metaphysis of the 5th metatarsal.  Visualized tendons appear unremarkable.  The ankle ligaments are intact.  A small tibiotalar joint effusion is present.  Within the sinus tarsi is a 1.8 cm x 1.2 cm cystic focus which may represent a synovial cyst or ganglion.  A small plantar calcaneal spurs noted.  The plantar fascia appears intact.  There is soft tissue edema along the proximal plantar fascia.        Impression:   1. Plantar calcaneal spur 2. Edema adjacent to the proximal plantar fascia.   Correlate clinically for plantar fasciitis 3. 1.8 cm x 1.2 cm synovial cyst versus ganglion in the sinus tarsi. 4. Small tibiotalar joint effusion 5. Mild osseous edema in the base of the 5th metatarsal likely representing an osseous contusion in the setting of previous trauma.     Gaetano Walker M.D.       Electronically Signed and Approved By: Gaetano Walker M.D. on 7/20/2022 at 17:16               ASSESSMENT/PLAN     Diagnoses and all orders for this visit:    1. Foot pain, right (Primary)    2. Sprain of right ankle, unspecified ligament, initial encounter    3. Plantar fasciitis        Comprehensive lower extremity examination and evaluation was performed.    Discussed findings and treatment plan including risks, benefits, and treatment options with patient in detail. Patient agreed with treatment plan.    Medications and allergies reviewed.  Reviewed available lab values along with other pertinent labs.  These were discussed with the patient.    Patient may begin to weight bear as tolerated in supportive shoes.  No impact activities for two weeks.  After that time, the patient may increase activities as tolerated. Patient states understanding and agreement with this plan.    Rice Therapy: It is important to treat any injury as soon as possible to help control swelling and increase recovery time. The recognized regimen for immediate treatment of sport injuries includes rest, ice (cold application), compression, and elevation (RICE). Remove the injured athlete from play, apply ice to the affected area, wrap or compress the injured area with an elastic bandage when appropriate, and elevate the injured area above heart level to reduce swelling.  The patient is to not use ice for longer than 20 minutes at a time, with at least 20 minutes of no ice usage between applications.  The patient states understanding and agreement with this plan.    An After Visit Summary was printed and given to the patient at discharge,  including (if requested) any available informative/educational handouts regarding diagnosis, treatment, or medications. All questions were answered to patient/family satisfaction. Should symptoms fail to improve or worsen they agree to call or return to clinic or to go to the Emergency Department. Discussed the importance of following up with any needed screening tests/labs/specialist appointments and any requested follow-up recommended by me today. Importance of maintaining follow-up discussed and patient accepts that missed appointments can delay diagnosis and potentially lead to worsening of conditions.    Return if symptoms worsen or fail to improve., or sooner if acute issues arise.    This document has been electronically signed by Johnson Jansen DPM on July 29, 2022 07:25 EDT

## 2025-03-13 ENCOUNTER — OFFICE VISIT (OUTPATIENT)
Age: 35
End: 2025-03-13
Payer: COMMERCIAL

## 2025-03-13 VITALS
SYSTOLIC BLOOD PRESSURE: 114 MMHG | HEART RATE: 63 BPM | RESPIRATION RATE: 19 BRPM | BODY MASS INDEX: 34.93 KG/M2 | HEIGHT: 62 IN | DIASTOLIC BLOOD PRESSURE: 72 MMHG | OXYGEN SATURATION: 100 % | WEIGHT: 189.8 LBS

## 2025-03-13 DIAGNOSIS — Z90.710 HISTORY OF HYSTERECTOMY: ICD-10-CM

## 2025-03-13 DIAGNOSIS — Z80.3 FAMILY HISTORY OF BREAST CANCER: ICD-10-CM

## 2025-03-13 DIAGNOSIS — N89.8 VAGINAL DISCHARGE: ICD-10-CM

## 2025-03-13 DIAGNOSIS — N63.22 MASS OF UPPER INNER QUADRANT OF LEFT BREAST: ICD-10-CM

## 2025-03-13 DIAGNOSIS — Z01.419 WELL FEMALE EXAM WITH ROUTINE GYNECOLOGICAL EXAM: Primary | ICD-10-CM

## 2025-03-13 DIAGNOSIS — F43.21 GRIEF: ICD-10-CM

## 2025-03-13 DIAGNOSIS — N64.4 BREAST TENDERNESS: ICD-10-CM

## 2025-03-13 PROBLEM — J30.2 SEASONAL ALLERGIC RHINITIS: Status: ACTIVE | Noted: 2025-03-13

## 2025-03-13 PROBLEM — G43.909 MIGRAINES: Status: ACTIVE | Noted: 2025-03-13

## 2025-03-13 PROBLEM — F32.A DEPRESSION: Status: ACTIVE | Noted: 2025-03-13

## 2025-03-13 PROBLEM — B35.3 ATHLETES FOOT: Status: ACTIVE | Noted: 2025-03-13

## 2025-03-13 PROBLEM — F41.9 ANXIETY: Status: ACTIVE | Noted: 2025-03-13

## 2025-03-13 PROBLEM — M72.2 PLANTAR FASCIAL FIBROMATOSIS: Status: ACTIVE | Noted: 2019-01-16

## 2025-03-13 PROBLEM — D64.9 ANEMIA: Status: ACTIVE | Noted: 2025-03-13

## 2025-03-13 PROBLEM — M79.671 FOOT PAIN, RIGHT: Status: ACTIVE | Noted: 2025-03-13

## 2025-03-13 PROCEDURE — 87801 DETECT AGNT MULT DNA AMPLI: CPT

## 2025-03-13 PROCEDURE — 87798 DETECT AGENT NOS DNA AMP: CPT

## 2025-03-13 PROCEDURE — 87591 N.GONORRHOEAE DNA AMP PROB: CPT

## 2025-03-13 PROCEDURE — 87661 TRICHOMONAS VAGINALIS AMPLIF: CPT

## 2025-03-13 PROCEDURE — 87491 CHLMYD TRACH DNA AMP PROBE: CPT

## 2025-03-13 NOTE — PROGRESS NOTES
Subjective     Chief Complaint   Patient presents with    Breast Problem     New GYN, Annual exam, Last Pap unknown, Pt C/O Left breast lump, was tender at first but not so much anymore       History of Present Illness    Kristen Orellana is a 35 y.o.  who presents for annual exam and concerns about a breast lump  About a week ago noticed a breast lump in her left breast it was tender not tender now  Her brother just passed away a few weeks ago grieving   Had a hysterectomy for endometriosis and ovarian cyst has one ovary   Has some vaginal discharge no odor or itching  Works at [employer]   Has family history on her moms side of breast cancer including her mom, gma and aunt  Denies excessive caffeine use      Obstetric History:  OB History          3    Para   2    Term                AB   1    Living             SAB   1    IAB        Ectopic        Molar        Multiple        Live Births                   Menstrual History:     No LMP recorded. Patient has had a hysterectomy.         Current contraception: status post hysterectomy  History of abnormal Pap smear: yes - hx fine after repeat pap  Received Gardasil immunization: no  Perform regular self breast exam: yes - monthly  Family history of uterine or ovarian cancer: no  Family History of colon cancer: yes - pgm  Family history of breast cancer: yes - mgm, mom, maternal aunt    Mammogram: ordered.  Colonoscopy: not indicated.  DEXA: not indicated.    Exercise: moderately active  Calcium/Vitamin D: adequate intake and uses supplements    The following portions of the patient's history were reviewed and updated as appropriate: allergies, current medications, past family history, past medical history, past social history, past surgical history, and problem list.    Review of Systems   Constitutional: Negative.    HENT: Negative.     Eyes: Negative.    Respiratory: Negative.     Cardiovascular: Negative.    Gastrointestinal:  "Negative.    Endocrine: Negative.    Genitourinary: Negative.    Musculoskeletal: Negative.    Skin: Negative.    Allergic/Immunologic: Negative.    Neurological: Negative.    Hematological: Negative.    Psychiatric/Behavioral: Negative.             Objective   Physical Exam  Vitals and nursing note reviewed. Exam conducted with a chaperone present (cheryl humphreys).   Constitutional:       Appearance: Normal appearance.   HENT:      Head: Normocephalic.   Eyes:      Pupils: Pupils are equal, round, and reactive to light.   Cardiovascular:      Rate and Rhythm: Normal rate and regular rhythm.      Pulses: Normal pulses.      Heart sounds: Normal heart sounds.   Pulmonary:      Effort: Pulmonary effort is normal.      Breath sounds: Normal breath sounds.   Chest:   Breasts:     Right: Normal.      Left: Normal. Mass and tenderness present.          Comments: Oval tender mobile mass at 12   Abdominal:      General: Abdomen is flat. Bowel sounds are normal.      Palpations: Abdomen is soft.   Genitourinary:     General: Normal vulva.      Exam position: Lithotomy position.      Vagina: Normal.      Rectum: Normal.      Comments: Surgically absent uterus and cervix   Musculoskeletal:         General: Normal range of motion.      Cervical back: Full passive range of motion without pain and normal range of motion.      Right lower leg: No edema.      Left lower leg: No edema.   Lymphadenopathy:      Head:      Right side of head: No submental or submandibular adenopathy.      Left side of head: No submental or submandibular adenopathy.   Skin:     General: Skin is warm and dry.   Neurological:      General: No focal deficit present.      Mental Status: She is alert.      Gait: Gait is intact.   Psychiatric:         Attention and Perception: Attention normal.         Mood and Affect: Mood normal.         Speech: Speech normal.         /72   Pulse 63   Resp 19   Ht 157.5 cm (62\")   Wt 86.1 kg (189 lb 12.8 oz)   SpO2 " 100%   BMI 34.71 kg/m²     Assessment & Plan   Diagnoses and all orders for this visit:    1. Well female exam with routine gynecological exam (Primary)    2. Vaginal discharge  -     NuSwab VG+ - Swab, Vagina    3. Mass of upper inner quadrant of left breast  -     Mammo Diagnostic Digital Tomosynthesis Left With CAD; Future  -     US Breast Left Limited; Future    4. Family history of breast cancer    5. History of hysterectomy    6. Grief    7. Breast tenderness      Pap not indicated  Nuswab to r/o infection  Breast imaging ordered  Will complete empower today for family hx   All questions answered and addressed  Breast self exam technique reviewed and patient encouraged to perform self-exam monthly.  Discussed healthy lifestyle modifications.  Recommended 30 minutes of aerobic exercise five times per week.  Encouraged multivitamin use with vit d  Call the office if you have not received results from today's visit within 2 weeks               Lindsey Goddard, APRN  3/13/2025, 13:56 EDT

## 2025-03-18 LAB
A VAGINAE DNA VAG QL NAA+PROBE: NORMAL SCORE
BVAB2 DNA VAG QL NAA+PROBE: NORMAL SCORE
C ALBICANS DNA VAG QL NAA+PROBE: NEGATIVE
C GLABRATA DNA VAG QL NAA+PROBE: NEGATIVE
C TRACH DNA SPEC QL NAA+PROBE: NEGATIVE
MEGA1 DNA VAG QL NAA+PROBE: NORMAL SCORE
N GONORRHOEA DNA VAG QL NAA+PROBE: NEGATIVE
T VAGINALIS DNA VAG QL NAA+PROBE: NEGATIVE

## 2025-03-28 LAB
Lab: NEGATIVE
Lab: NORMAL

## 2025-04-09 ENCOUNTER — HOSPITAL ENCOUNTER (OUTPATIENT)
Dept: ULTRASOUND IMAGING | Facility: HOSPITAL | Age: 35
Discharge: HOME | End: 2025-04-09
Payer: COMMERCIAL

## 2025-04-09 ENCOUNTER — HOSPITAL ENCOUNTER (OUTPATIENT)
Dept: MAMMOGRAPHY | Facility: HOSPITAL | Age: 35
Discharge: HOME | End: 2025-04-09
Payer: COMMERCIAL

## 2025-04-09 DIAGNOSIS — N63.22 MASS OF UPPER INNER QUADRANT OF LEFT BREAST: ICD-10-CM

## 2025-04-09 PROCEDURE — G0279 TOMOSYNTHESIS, MAMMO: HCPCS

## 2025-04-09 PROCEDURE — 77066 DX MAMMO INCL CAD BI: CPT

## 2025-04-09 PROCEDURE — 76642 ULTRASOUND BREAST LIMITED: CPT

## 2025-04-25 ENCOUNTER — OFFICE VISIT (OUTPATIENT)
Dept: FAMILY MEDICINE CLINIC | Facility: CLINIC | Age: 35
End: 2025-04-25
Payer: COMMERCIAL

## 2025-04-25 VITALS
TEMPERATURE: 97.7 F | SYSTOLIC BLOOD PRESSURE: 138 MMHG | HEIGHT: 62 IN | HEART RATE: 79 BPM | OXYGEN SATURATION: 99 % | BODY MASS INDEX: 33.49 KG/M2 | DIASTOLIC BLOOD PRESSURE: 80 MMHG | WEIGHT: 182 LBS

## 2025-04-25 DIAGNOSIS — Z11.59 ENCOUNTER FOR HEPATITIS C SCREENING TEST FOR LOW RISK PATIENT: ICD-10-CM

## 2025-04-25 DIAGNOSIS — Z13.1 DIABETES MELLITUS SCREENING: ICD-10-CM

## 2025-04-25 DIAGNOSIS — Z13.29 THYROID DISORDER SCREENING: ICD-10-CM

## 2025-04-25 DIAGNOSIS — F41.9 ANXIETY: ICD-10-CM

## 2025-04-25 DIAGNOSIS — E66.811 CLASS 1 OBESITY WITHOUT SERIOUS COMORBIDITY WITH BODY MASS INDEX (BMI) OF 33.0 TO 33.9 IN ADULT, UNSPECIFIED OBESITY TYPE: ICD-10-CM

## 2025-04-25 DIAGNOSIS — Z13.220 LIPID SCREENING: ICD-10-CM

## 2025-04-25 DIAGNOSIS — R53.83 OTHER FATIGUE: ICD-10-CM

## 2025-04-25 DIAGNOSIS — Z76.89 ENCOUNTER TO ESTABLISH CARE: Primary | ICD-10-CM

## 2025-04-25 DIAGNOSIS — F32.A DEPRESSION, UNSPECIFIED DEPRESSION TYPE: ICD-10-CM

## 2025-04-25 PROCEDURE — 80050 GENERAL HEALTH PANEL: CPT

## 2025-04-25 PROCEDURE — 82306 VITAMIN D 25 HYDROXY: CPT

## 2025-04-25 PROCEDURE — 84466 ASSAY OF TRANSFERRIN: CPT

## 2025-04-25 PROCEDURE — 82746 ASSAY OF FOLIC ACID SERUM: CPT

## 2025-04-25 PROCEDURE — 80061 LIPID PANEL: CPT

## 2025-04-25 PROCEDURE — 86803 HEPATITIS C AB TEST: CPT

## 2025-04-25 PROCEDURE — 83036 HEMOGLOBIN GLYCOSYLATED A1C: CPT

## 2025-04-25 PROCEDURE — 82607 VITAMIN B-12: CPT

## 2025-04-25 PROCEDURE — 83540 ASSAY OF IRON: CPT

## 2025-04-25 RX ORDER — ESCITALOPRAM OXALATE 5 MG/1
5 TABLET ORAL DAILY
Qty: 30 TABLET | Refills: 0 | Status: SHIPPED | OUTPATIENT
Start: 2025-04-25

## 2025-04-25 RX ORDER — BUSPIRONE HYDROCHLORIDE 5 MG/1
5 TABLET ORAL 3 TIMES DAILY
Qty: 90 TABLET | Refills: 0 | Status: SHIPPED | OUTPATIENT
Start: 2025-04-25

## 2025-04-25 NOTE — PROGRESS NOTES
Venipuncture Blood Specimen Collection  Venipuncture performed in left arm by Jose Chamberlain with good hemostasis. Patient tolerated the procedure well without complications.   04/25/25   Jose Chamberlain

## 2025-04-25 NOTE — PROGRESS NOTES
Chief Complaint  Establish Care and Depression (Overwhelmed by grief )    Little interest or pleasure in doing things? Several days   Feeling down, depressed, or hopeless? Several days   PHQ-2 Total Score 2   Trouble falling or staying asleep, or sleeping too much? Several days   Feeling tired or having little energy? More than half the days   Poor appetite or overeating? Several days   Feeling bad about yourself - or that you are a failure or have let yourself or your family down? Several days   Trouble concentrating on things, such as reading the newspaper or watching television? Several days   Moving or speaking so slowly that other people could have noticed? Or the opposite - being so fidgety or restless that you have been moving around a lot more than usual? Several days     Thoughts that you would be better off dead, or of hurting yourself in some way? Not at all   PHQ-9 Total Score 9   If you checked off any problems, how difficult have these problems made it for you to do your work, take care of things at home, or get along with other people? Extremely difficult               History of Present Illness:  Kristen Orellana is a 35 y.o. female who presents to [facility] with a past medical history of  Past Medical History:   Diagnosis Date    Abnormal Pap smear of cervix     Ankle pain, right     Endometriosis     Foot pain, bilateral     Ovarian cyst         History of Present Illness  The patient is a 35-year-old female who presents today to establish care.    The chief complaint includes significant stress due to recent traumatic events. She reports the sudden loss of her brother in 02/2025, the death of a close colleague, and the return of cancer in another colleague. Additionally, she is managing her 's short-term memory loss following an accident in 2020. These stressors have led to feelings of being overwhelmed, anger, irritability, and anxiety, with  "occasional bouts of depression. Poor sleep quality, irregular eating habits, and physical symptoms such as restlessness and unease are reported. Attempts at talk therapy through a program at [facility] were found unhelpful. Previous trials of Zoloft and Wellbutrin for mental health issues were ineffective or exacerbated her symptoms. No suicidal ideation or thoughts of self-harm are reported. Diagnosed with ADHD in her youth, she was not medicated but believes she still exhibits symptoms. No history of cardiac issues or murmurs is reported.    A weight loss journey over the past 2.5 years has resulted in an 80-pound loss, with an initial weight of 240 pounds. The regimen includes intermittent fasting, increased protein intake, and the elimination of soda from her diet. She used to drink 120 ounces of water a day but has recently gained some weight due to current stressors and inconsistent eating habits.    A recent scare involved a suspected lump in her breast. A mammogram showed normal findings, though dense breasts were noted. Blood work has not been done for a long time.    SOCIAL HISTORY  She works as a manager at [employer].    FAMILY HISTORY  Breast cancer runs very strongly in her family. Her mother was diagnosed with breast cancer at age 34, and her aunt was diagnosed at the same age as the patient is now. She also has a family history of colon cancer.      Objective   Vital Signs:   Vitals:    04/25/25 1649   BP: 138/80   Pulse: 79   Temp: 97.7 °F (36.5 °C)   SpO2: 99%   Weight: 82.6 kg (182 lb)   Height: 157.5 cm (62\")     Body mass index is 33.29 kg/m².    Wt Readings from Last 3 Encounters:   04/25/25 82.6 kg (182 lb)   03/13/25 86.1 kg (189 lb 12.8 oz)   07/29/22 102 kg (225 lb)     BP Readings from Last 3 Encounters:   04/25/25 138/80   03/13/25 114/72   07/29/22 147/73       Health Maintenance   Topic Date Due    Pneumococcal Vaccine 0-49 (1 of 2 - PCV) Never done    ANNUAL PHYSICAL  Never done    COVID-19 " Vaccine (1 - 2024-25 season) Never done    INFLUENZA VACCINE  07/01/2025    TDAP/TD VACCINES (2 - Td or Tdap) 04/27/2029    HEPATITIS C SCREENING  Completed       Review of Systems   Physical Exam  Vitals reviewed.   Constitutional:       Appearance: Normal appearance.   Eyes:      Pupils: Pupils are equal, round, and reactive to light.   Cardiovascular:      Rate and Rhythm: Normal rate and regular rhythm.   Pulmonary:      Effort: Pulmonary effort is normal.      Breath sounds: Normal breath sounds.   Skin:     General: Skin is warm and dry.   Neurological:      General: No focal deficit present.      Mental Status: She is alert and oriented to person, place, and time.   Psychiatric:         Mood and Affect: Mood normal.            Result Review :  The following data was reviewed by: MATTHEW Thompson on 04/25/2025:  Office Visit on 04/25/2025   Component Date Value    25 Hydroxy, Vitamin D 04/25/2025 22.1 (L)     Folate 04/25/2025 8.67     Vitamin B-12 04/25/2025 531     Total Cholesterol 04/25/2025 135     Triglycerides 04/25/2025 50     HDL Cholesterol 04/25/2025 53     LDL Cholesterol  04/25/2025 71     VLDL Cholesterol 04/25/2025 11     LDL/HDL Ratio 04/25/2025 1.36     Iron 04/25/2025 62     Iron Saturation (TSAT) 04/25/2025 18 (L)     Transferrin 04/25/2025 232     TIBC 04/25/2025 346     Hepatitis C Ab 04/25/2025 Non-Reactive     Hemoglobin A1C 04/25/2025 5.30     WBC 04/25/2025 7.97     RBC 04/25/2025 4.82     Hemoglobin 04/25/2025 14.1     Hematocrit 04/25/2025 42.8     MCV 04/25/2025 88.8     MCH 04/25/2025 29.3     MCHC 04/25/2025 32.9     RDW 04/25/2025 13.6     RDW-SD 04/25/2025 44.6     MPV 04/25/2025 13.4 (H)     Platelets 04/25/2025 154     Neutrophil % 04/25/2025 55.5     Lymphocyte % 04/25/2025 29.5     Monocyte % 04/25/2025 9.2     Eosinophil % 04/25/2025 5.0     Basophil % 04/25/2025 0.5     Immature Grans % 04/25/2025 0.3     Neutrophils, Absolute 04/25/2025 4.43     Lymphocytes, Absolute  04/25/2025 2.35     Monocytes, Absolute 04/25/2025 0.73     Eosinophils, Absolute 04/25/2025 0.40     Basophils, Absolute 04/25/2025 0.04     Immature Grans, Absolute 04/25/2025 0.02     Glucose 04/25/2025 107 (H)     BUN 04/25/2025 13     Creatinine 04/25/2025 0.29 (L)     Sodium 04/25/2025 140     Potassium 04/25/2025 4.4     Chloride 04/25/2025 103     CO2 04/25/2025 27.0     Calcium 04/25/2025 9.3     Total Protein 04/25/2025 7.1     Albumin 04/25/2025 4.2     ALT (SGPT) 04/25/2025 20     AST (SGOT) 04/25/2025 29     Alkaline Phosphatase 04/25/2025 44     Total Bilirubin 04/25/2025 0.2     Globulin 04/25/2025 2.9     A/G Ratio 04/25/2025 1.4     BUN/Creatinine Ratio 04/25/2025 44.8 (H)     Anion Gap 04/25/2025 10.0     eGFR 04/25/2025 142.3     TSH 04/25/2025 0.857        Results  Imaging   - Mammogram: Dense breasts but no abnormalities      Procedures        Assessment and Plan   Diagnoses and all orders for this visit:    1. Encounter to establish care (Primary)    2. Anxiety  -     escitalopram (Lexapro) 5 MG tablet; Take 1 tablet by mouth Daily.  Dispense: 30 tablet; Refill: 0  -     busPIRone (BUSPAR) 5 MG tablet; Take 1 tablet by mouth 3 (Three) Times a Day.  Dispense: 90 tablet; Refill: 0  -     Ambulatory Referral to Psychiatry    3. Depression, unspecified depression type  -     escitalopram (Lexapro) 5 MG tablet; Take 1 tablet by mouth Daily.  Dispense: 30 tablet; Refill: 0  -     Ambulatory Referral to Psychiatry    4. Diabetes mellitus screening  -     Hemoglobin A1c    5. Thyroid disorder screening  -     TSH Rfx On Abnormal To Free T4    6. Encounter for hepatitis C screening test for low risk patient  -     Hepatitis C Antibody    7. Other fatigue  -     Vitamin B12 & Folate  -     Iron Profile  -     CBC Auto Differential  -     Comprehensive Metabolic Panel  -     Cancel: Manual Differential    8. Class 1 obesity without serious comorbidity with body mass index (BMI) of 33.0 to 33.9 in adult,  unspecified obesity type  -     Vitamin D,25-Hydroxy    9. Lipid screening  -     Lipid Panel        Assessment & Plan  1. Anxiety.  - Reports significant anxiety exacerbated by recent life stressors, including the loss of family members and colleagues.  - Comprehensive blood work panel will be ordered to assess thyroid function, vitamin levels, and other relevant parameters.  - Lexapro 5 mg daily for 2 weeks will be initiated, with a follow-up appointment scheduled in 2 weeks to evaluate the need for dosage adjustment.  - Buspirone 5 mg will be prescribed for administration up to three times daily as needed, with a minimum of twice daily. If the current medication regimen proves ineffective, a referral to a mental health nurse practitioner will be considered.    2. Depression.  - Reports symptoms consistent with depression, including irritability, restlessness, and difficulty sleeping.  - The same treatment plan for anxiety will be applied, including the initiation of Lexapro and buspirone.  - If symptoms persist or worsen, further evaluation and potential medication adjustments will be made.  - Referral to a mental health nurse practitioner will be considered if current medications are ineffective.    3. Attention deficit hyperactivity disorder (ADHD).  - History of ADHD diagnosed in childhood but was not treated with medication.  - Referral to a mental health nurse practitioner will be made for further evaluation and management of ADHD.  - Evaluation by the mental health nurse practitioner will include assessment for ADHD.    4. Weight management.  - Has been on a weight loss journey for the past 2.5 years, during which she has lost 80 pounds.  - Weighed 240 pounds 2.5 years ago and has successfully managed weight through intermittent fasting and increased protein intake.  - Reports improvement in symptoms of plantar fasciitis following weight loss.  - Encouraged to maintain current weight management strategies  and monitor progress.    5. Preventative care.  - Mammogram looked normal, but she was told that she had dense breasts.  - Significant family history of breast cancer and colon cancer.  - Comprehensive blood work panel will be ordered to assess thyroid function, vitamin levels, and other relevant parameters.  - Emphasized the importance of regular screenings given her family history.    Follow-up  - Follow-up appointment scheduled in 2 weeks to assess the effectiveness of the prescribed medications and review blood work results.  - Referral to the mental health nurse practitioner will be made, with an appointment to be scheduled in approximately 3 weeks.  - Patient advised to message via [facility] if experiencing any issues with the medication or if there are any concerns before the follow-up appointment.      BMI is >= 30 and <35. (Class 1 Obesity). The following options were offered after discussion;: information on healthy weight added to patient's after visit summary          FOLLOW UP  Return in about 2 weeks (around 5/9/2025) for Recheck.    Patient was given instructions and counseling regarding her condition or for health maintenance advice. Please see specific information pulled into the AVS if appropriate.       MATTHEW Thompson  04/28/25  09:31 EDT    CURRENT & DISCONTINUED MEDICATIONS  Current Outpatient Medications   Medication Instructions    busPIRone (BUSPAR) 5 mg, Oral, 3 Times Daily    escitalopram (LEXAPRO) 5 mg, Oral, Daily       Medications Discontinued During This Encounter   Medication Reason    ibuprofen (ADVIL,MOTRIN) 400 MG tablet *Therapy completed        EMR Dragon/Transcription disclaimer:  Parts of this encounter note are electronic transcription/translation of spoken language to printed text.     Patient or patient representative verbalized consent for the use of Ambient Listening during the visit with  MATTHEW Thompson for chart documentation. 4/28/2025  17:11 EDT

## 2025-04-26 LAB
25(OH)D3 SERPL-MCNC: 22.1 NG/ML (ref 30–100)
ALBUMIN SERPL-MCNC: 4.2 G/DL (ref 3.5–5.2)
ALBUMIN/GLOB SERPL: 1.4 G/DL
ALP SERPL-CCNC: 44 U/L (ref 39–117)
ALT SERPL W P-5'-P-CCNC: 20 U/L (ref 1–33)
ANION GAP SERPL CALCULATED.3IONS-SCNC: 10 MMOL/L (ref 5–15)
AST SERPL-CCNC: 29 U/L (ref 1–32)
BASOPHILS # BLD AUTO: 0.04 10*3/MM3 (ref 0–0.2)
BASOPHILS NFR BLD AUTO: 0.5 % (ref 0–1.5)
BILIRUB SERPL-MCNC: 0.2 MG/DL (ref 0–1.2)
BUN SERPL-MCNC: 13 MG/DL (ref 6–20)
BUN/CREAT SERPL: 44.8 (ref 7–25)
CALCIUM SPEC-SCNC: 9.3 MG/DL (ref 8.6–10.5)
CHLORIDE SERPL-SCNC: 103 MMOL/L (ref 98–107)
CHOLEST SERPL-MCNC: 135 MG/DL (ref 0–200)
CO2 SERPL-SCNC: 27 MMOL/L (ref 22–29)
CREAT SERPL-MCNC: 0.29 MG/DL (ref 0.57–1)
DEPRECATED RDW RBC AUTO: 44.6 FL (ref 37–54)
EGFRCR SERPLBLD CKD-EPI 2021: 142.3 ML/MIN/1.73
EOSINOPHIL # BLD AUTO: 0.4 10*3/MM3 (ref 0–0.4)
EOSINOPHIL NFR BLD AUTO: 5 % (ref 0.3–6.2)
ERYTHROCYTE [DISTWIDTH] IN BLOOD BY AUTOMATED COUNT: 13.6 % (ref 12.3–15.4)
FOLATE SERPL-MCNC: 8.67 NG/ML (ref 4.78–24.2)
GLOBULIN UR ELPH-MCNC: 2.9 GM/DL
GLUCOSE SERPL-MCNC: 107 MG/DL (ref 65–99)
HBA1C MFR BLD: 5.3 % (ref 4.8–5.6)
HCT VFR BLD AUTO: 42.8 % (ref 34–46.6)
HCV AB SER QL: NORMAL
HDLC SERPL-MCNC: 53 MG/DL (ref 40–60)
HGB BLD-MCNC: 14.1 G/DL (ref 12–15.9)
IMM GRANULOCYTES # BLD AUTO: 0.02 10*3/MM3 (ref 0–0.05)
IMM GRANULOCYTES NFR BLD AUTO: 0.3 % (ref 0–0.5)
IRON 24H UR-MRATE: 62 MCG/DL (ref 37–145)
IRON SATN MFR SERPL: 18 % (ref 20–50)
LDLC SERPL CALC-MCNC: 71 MG/DL (ref 0–100)
LDLC/HDLC SERPL: 1.36 {RATIO}
LYMPHOCYTES # BLD AUTO: 2.35 10*3/MM3 (ref 0.7–3.1)
LYMPHOCYTES NFR BLD AUTO: 29.5 % (ref 19.6–45.3)
MCH RBC QN AUTO: 29.3 PG (ref 26.6–33)
MCHC RBC AUTO-ENTMCNC: 32.9 G/DL (ref 31.5–35.7)
MCV RBC AUTO: 88.8 FL (ref 79–97)
MONOCYTES # BLD AUTO: 0.73 10*3/MM3 (ref 0.1–0.9)
MONOCYTES NFR BLD AUTO: 9.2 % (ref 5–12)
NEUTROPHILS NFR BLD AUTO: 4.43 10*3/MM3 (ref 1.7–7)
NEUTROPHILS NFR BLD AUTO: 55.5 % (ref 42.7–76)
PLATELET # BLD AUTO: 154 10*3/MM3 (ref 140–450)
PMV BLD AUTO: 13.4 FL (ref 6–12)
POTASSIUM SERPL-SCNC: 4.4 MMOL/L (ref 3.5–5.2)
PROT SERPL-MCNC: 7.1 G/DL (ref 6–8.5)
RBC # BLD AUTO: 4.82 10*6/MM3 (ref 3.77–5.28)
SODIUM SERPL-SCNC: 140 MMOL/L (ref 136–145)
TIBC SERPL-MCNC: 346 MCG/DL (ref 298–536)
TRANSFERRIN SERPL-MCNC: 232 MG/DL (ref 200–360)
TRIGL SERPL-MCNC: 50 MG/DL (ref 0–150)
TSH SERPL DL<=0.05 MIU/L-ACNC: 0.86 UIU/ML (ref 0.27–4.2)
VIT B12 BLD-MCNC: 531 PG/ML (ref 211–946)
VLDLC SERPL-MCNC: 11 MG/DL (ref 5–40)
WBC NRBC COR # BLD AUTO: 7.97 10*3/MM3 (ref 3.4–10.8)

## 2025-05-09 ENCOUNTER — OFFICE VISIT (OUTPATIENT)
Dept: FAMILY MEDICINE CLINIC | Facility: CLINIC | Age: 35
End: 2025-05-09
Payer: COMMERCIAL

## 2025-05-09 VITALS
HEIGHT: 62 IN | BODY MASS INDEX: 33.86 KG/M2 | DIASTOLIC BLOOD PRESSURE: 78 MMHG | OXYGEN SATURATION: 98 % | TEMPERATURE: 97.8 F | WEIGHT: 184 LBS | SYSTOLIC BLOOD PRESSURE: 114 MMHG | HEART RATE: 97 BPM

## 2025-05-09 DIAGNOSIS — S40.862A TICK BITE OF LEFT UPPER ARM, INITIAL ENCOUNTER: ICD-10-CM

## 2025-05-09 DIAGNOSIS — F32.A DEPRESSION, UNSPECIFIED DEPRESSION TYPE: ICD-10-CM

## 2025-05-09 DIAGNOSIS — Z87.891 PERSONAL HISTORY OF TOBACCO USE, PRESENTING HAZARDS TO HEALTH: ICD-10-CM

## 2025-05-09 DIAGNOSIS — E55.9 VITAMIN D DEFICIENCY: ICD-10-CM
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
## 2025-05-09 DIAGNOSIS — F41.9 ANXIETY: Primary | ICD-10-CM

## 2025-05-09 RX ORDER — BUSPIRONE HYDROCHLORIDE 5 MG/1
10 TABLET ORAL 3 TIMES DAILY
Qty: 180 TABLET | Refills: 0 | Status: SHIPPED | OUTPATIENT
Start: 2025-05-09

## 2025-05-09 NOTE — PROGRESS NOTES
Chief Complaint  Anxiety (Follow up to new medication)    The PHQ has not been completed during this encounter.         History of Present Illness:  Kristen Orellana is a 35 y.o. female who presents to [facility] with a past medical history of  Past Medical History:   Diagnosis Date    Abnormal Pap smear of cervix     Ankle pain, right     Endometriosis     Foot pain, bilateral     Ovarian cyst         History of Present Illness  The patient is a 35-year-old female who presents today for a 2-week follow-up.    She reports experiencing severe headaches and gastrointestinal discomfort since the initiation of her medication regimen, which includes Lexapro and buspirone. She recalls a particularly distressing day when she took her morning dose of Lexapro and one of the three daily doses of buspirone but refrained from taking the remaining two doses due to feeling unwell. She is uncertain if these symptoms are side effects of the medications. Despite these challenges, she acknowledges having had one exceptionally good day, a rarity in her recent experience. However, her condition has deteriorated over the past week due to additional stressors.    She mentions a tick bite on her arm that occurred over a week ago, which her  believes may be contributing to her symptoms. Since the tick bite, she has experienced headaches and general malaise. She took Benadryl for the itching, which has since subsided. She has also been experiencing back pain, which she attributes to her work, and shoulder discomfort. She continues to feel nauseated but has not experienced headaches for some time. She has an appointment with Dipika next week. She has been taking BC powder for symptom relief.    She has been taking over-the-counter vitamins, including B12, probiotics, and stress gummies with ashwagandha, for over 2 months and is questioning whether she should discontinue them. She has been  "attempting to quit smoking but has unfortunately increased her consumption recently. She had previously achieved a 2-month period of abstinence using patches.    She has made significant dietary changes and does not believe she has any cholesterol issues.    SOCIAL HISTORY  The patient admits to smoking.      Objective   Vital Signs:   Vitals:    05/09/25 1432   BP: 114/78   Pulse: 97   Temp: 97.8 °F (36.6 °C)   SpO2: 98%   Weight: 83.5 kg (184 lb)   Height: 157.5 cm (62\")     Body mass index is 33.65 kg/m².    Wt Readings from Last 3 Encounters:   05/09/25 83.5 kg (184 lb)   04/25/25 82.6 kg (182 lb)   03/13/25 86.1 kg (189 lb 12.8 oz)     BP Readings from Last 3 Encounters:   05/09/25 114/78   04/25/25 138/80   03/13/25 114/72       Health Maintenance   Topic Date Due    Pneumococcal Vaccine 0-49 (1 of 2 - PCV) Never done    ANNUAL PHYSICAL  Never done    COVID-19 Vaccine (1 - 2024-25 season) 05/09/2026 (Originally 9/1/2024)    INFLUENZA VACCINE  07/01/2025    TDAP/TD VACCINES (2 - Td or Tdap) 04/27/2029    HEPATITIS C SCREENING  Completed       Review of Systems   Physical Exam  Vitals reviewed.   Constitutional:       Appearance: Normal appearance.   Eyes:      Pupils: Pupils are equal, round, and reactive to light.   Cardiovascular:      Rate and Rhythm: Normal rate and regular rhythm.   Pulmonary:      Effort: Pulmonary effort is normal.      Breath sounds: Normal breath sounds.   Skin:     General: Skin is warm and dry.          Neurological:      General: No focal deficit present.      Mental Status: She is alert and oriented to person, place, and time.   Psychiatric:         Mood and Affect: Mood normal.            Result Review :  The following data was reviewed by: MATTHEW Thompson on 05/09/2025:  Office Visit on 04/25/2025   Component Date Value    25 Hydroxy, Vitamin D 04/25/2025 22.1 (L)     Folate 04/25/2025 8.67     Vitamin B-12 04/25/2025 531     Total Cholesterol 04/25/2025 135     Triglycerides " 04/25/2025 50     HDL Cholesterol 04/25/2025 53     LDL Cholesterol  04/25/2025 71     VLDL Cholesterol 04/25/2025 11     LDL/HDL Ratio 04/25/2025 1.36     Iron 04/25/2025 62     Iron Saturation (TSAT) 04/25/2025 18 (L)     Transferrin 04/25/2025 232     TIBC 04/25/2025 346     Hepatitis C Ab 04/25/2025 Non-Reactive     Hemoglobin A1C 04/25/2025 5.30     WBC 04/25/2025 7.97     RBC 04/25/2025 4.82     Hemoglobin 04/25/2025 14.1     Hematocrit 04/25/2025 42.8     MCV 04/25/2025 88.8     MCH 04/25/2025 29.3     MCHC 04/25/2025 32.9     RDW 04/25/2025 13.6     RDW-SD 04/25/2025 44.6     MPV 04/25/2025 13.4 (H)     Platelets 04/25/2025 154     Neutrophil % 04/25/2025 55.5     Lymphocyte % 04/25/2025 29.5     Monocyte % 04/25/2025 9.2     Eosinophil % 04/25/2025 5.0     Basophil % 04/25/2025 0.5     Immature Grans % 04/25/2025 0.3     Neutrophils, Absolute 04/25/2025 4.43     Lymphocytes, Absolute 04/25/2025 2.35     Monocytes, Absolute 04/25/2025 0.73     Eosinophils, Absolute 04/25/2025 0.40     Basophils, Absolute 04/25/2025 0.04     Immature Grans, Absolute 04/25/2025 0.02     Glucose 04/25/2025 107 (H)     BUN 04/25/2025 13     Creatinine 04/25/2025 0.29 (L)     Sodium 04/25/2025 140     Potassium 04/25/2025 4.4     Chloride 04/25/2025 103     CO2 04/25/2025 27.0     Calcium 04/25/2025 9.3     Total Protein 04/25/2025 7.1     Albumin 04/25/2025 4.2     ALT (SGPT) 04/25/2025 20     AST (SGOT) 04/25/2025 29     Alkaline Phosphatase 04/25/2025 44     Total Bilirubin 04/25/2025 0.2     Globulin 04/25/2025 2.9     A/G Ratio 04/25/2025 1.4     BUN/Creatinine Ratio 04/25/2025 44.8 (H)     Anion Gap 04/25/2025 10.0     eGFR 04/25/2025 142.3     TSH 04/25/2025 0.857        Results  Labs   - Cholesterol levels: Below 100   - HDL: On the higher end      Procedures        Assessment and Plan   Diagnoses and all orders for this visit:    1. Anxiety (Primary)  -     busPIRone (BUSPAR) 5 MG tablet; Take 2 tablets by mouth 3  (Three) Times a Day.  Dispense: 180 tablet; Refill: 0    2. Depression, unspecified depression type    3. Personal history of tobacco use, presenting hazards to health    4. Tick bite of left upper arm, initial encounter    5. Vitamin D deficiency        Assessment & Plan  1. Headaches and gastrointestinal discomfort.  - Reports experiencing severe headaches and stomach issues since starting Lexapro and buspirone.  - Difficulty determining if symptoms are due to medication, stress, or recent tick bite.  - Physical exam findings include no significant abnormalities.  - Continue buspirone 10 mg three times a day. If symptoms persist or worsen, message for further evaluation.    2. Tick bite.  - Mentioned a tick bite over a week ago, causing itching.  - Took Benadryl, which helped reduce the itching.  - No immediate treatment required unless symptoms worsen.  - If symptoms worsen, message for further evaluation and potential treatment for Lyme disease or [place] spotted fever.    3. Ear infection.  - Ears appear red, likely due to fluid buildup from congestion.  - No treatment necessary at this time unless experiencing pain.  - If ear pain develops, message for a prescription.    4. Smoking cessation.  - Advised against attempting to quit smoking at this time due to current mental health status.  - Focus on improving mental health first before attempting to quit smoking again.    5. Cholesterol management.  - Cholesterol levels are commendable, especially considering no medication is being taken.  - Blood pressure readings within normal limits during this visit.  - Managing weight effectively.    6. Vitamin D recheck.  - Recheck of vitamin D levels will be conducted in 2-1/2 months.    Follow-up  The patient will follow up in 2-1/2 months.              Kristen Orellana  reports that she has been smoking cigarettes. She started smoking about 27 years ago. She has a 13.7 pack-year smoking history. She has been  exposed to tobacco smoke. She has never used smokeless tobacco. I have educated her on the risk of diseases from using tobacco products such as cancer, COPD, and heart disease.     I advised her to quit and she is not willing to quit.    I spent 3  minutes counseling the patient.         FOLLOW UP  Return in about 2 months (around 7/9/2025) for Annual physical.    Patient was given instructions and counseling regarding her condition or for health maintenance advice. Please see specific information pulled into the AVS if appropriate.       MATTHEW Thompson  05/11/25  10:21 EDT    CURRENT & DISCONTINUED MEDICATIONS  Current Outpatient Medications   Medication Instructions    busPIRone (BUSPAR) 10 mg, Oral, 3 Times Daily    escitalopram (LEXAPRO) 5 mg, Oral, Daily    vitamin D (ERGOCALCIFEROL) 50,000 Units, Oral, Weekly       Medications Discontinued During This Encounter   Medication Reason    busPIRone (BUSPAR) 5 MG tablet Reorder        EMR Dragon/Transcription disclaimer:  Parts of this encounter note are electronic transcription/translation of spoken language to printed text.     Patient or patient representative verbalized consent for the use of Ambient Listening during the visit with  MATTHEW Thompson for chart documentation. 5/11/2025  15:03 EDT

## 2025-05-16 ENCOUNTER — OFFICE VISIT (OUTPATIENT)
Age: 35
End: 2025-05-16
Payer: COMMERCIAL

## 2025-05-16 VITALS
OXYGEN SATURATION: 98 % | HEIGHT: 63 IN | DIASTOLIC BLOOD PRESSURE: 80 MMHG | BODY MASS INDEX: 31.95 KG/M2 | HEART RATE: 73 BPM | WEIGHT: 180.3 LBS | SYSTOLIC BLOOD PRESSURE: 120 MMHG

## 2025-05-16 DIAGNOSIS — F51.04 PSYCHOPHYSIOLOGICAL INSOMNIA: ICD-10-CM

## 2025-05-16 DIAGNOSIS — F41.1 GENERALIZED ANXIETY DISORDER: ICD-10-CM

## 2025-05-16 DIAGNOSIS — F43.21 GRIEF: ICD-10-CM

## 2025-05-16 DIAGNOSIS — F33.1 MODERATE EPISODE OF RECURRENT MAJOR DEPRESSIVE DISORDER: Primary | ICD-10-CM

## 2025-05-16 PROCEDURE — 90792 PSYCH DIAG EVAL W/MED SRVCS: CPT | Performed by: NURSE PRACTITIONER

## 2025-05-16 RX ORDER — BUSPIRONE HYDROCHLORIDE 10 MG/1
10 TABLET ORAL 2 TIMES DAILY
Qty: 60 TABLET | Refills: 1 | Status: SHIPPED | OUTPATIENT
Start: 2025-05-16 | End: 2025-07-15

## 2025-05-16 RX ORDER — ESCITALOPRAM OXALATE 10 MG/1
10 TABLET ORAL DAILY
Qty: 30 TABLET | Refills: 1 | Status: SHIPPED | OUTPATIENT
Start: 2025-05-16 | End: 2025-07-15

## 2025-05-16 RX ORDER — HYDROXYZINE HYDROCHLORIDE 10 MG/1
10 TABLET, FILM COATED ORAL 2 TIMES DAILY PRN
Qty: 60 TABLET | Refills: 1 | Status: SHIPPED | OUTPATIENT
Start: 2025-05-16 | End: 2025-07-15

## 2025-05-16 NOTE — TREATMENT PLAN
Multi-Disciplinary Problems (from Behavioral Health Treatment Plan)      Active Problems       Problem: Grief and Loss  Start Date: 05/16/25      Problem Details: The patient self-scales this problem as a 10 with 10 being the worst.          Goal Priority Start Date Expected End Date End Date    Patient will process feelings and identify and implement specific ways to facilitate the grieving process. -- 05/16/25 11/14/25 --    Goal Details: Progress toward goal:  Not appropriate to rate progress toward goal since this is the initial treatment plan.        Goal Intervention Frequency Start Date End Date    Assist patient in identifying and processing feelings about losses. Q3 Months 05/16/25 --    Intervention Details: Duration of treatment until remission of symptoms.        Goal Intervention Frequency Start Date End Date    Identify ways to grieve effectively and utilize healthy support systems Q3 Months 05/16/25 --    Intervention Details: Duration of treatment until remission of symptoms.                               I have discussed and reviewed this treatment plan with the patient.
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
## 2025-08-04 DIAGNOSIS — F41.1 GENERALIZED ANXIETY DISORDER: ICD-10-CM

## 2025-08-04 DIAGNOSIS — F33.1 MODERATE EPISODE OF RECURRENT MAJOR DEPRESSIVE DISORDER: ICD-10-CM

## 2025-08-04 RX ORDER — ESCITALOPRAM OXALATE 10 MG/1
10 TABLET ORAL DAILY
Qty: 30 TABLET | Refills: 0 | Status: SHIPPED | OUTPATIENT
Start: 2025-08-04

## 2025-08-20 DIAGNOSIS — F41.1 GENERALIZED ANXIETY DISORDER: ICD-10-CM

## 2025-08-20 DIAGNOSIS — F33.1 MODERATE EPISODE OF RECURRENT MAJOR DEPRESSIVE DISORDER: ICD-10-CM

## 2025-08-20 RX ORDER — ESCITALOPRAM OXALATE 10 MG/1
10 TABLET ORAL DAILY
Qty: 30 TABLET | Refills: 0 | OUTPATIENT
Start: 2025-08-20

## 2025-08-23 ENCOUNTER — OFFICE VISIT (OUTPATIENT)
Dept: FAMILY MEDICINE CLINIC | Facility: CLINIC | Age: 35
End: 2025-08-23
Payer: COMMERCIAL

## 2025-08-23 VITALS
HEIGHT: 63 IN | SYSTOLIC BLOOD PRESSURE: 112 MMHG | WEIGHT: 177 LBS | BODY MASS INDEX: 31.36 KG/M2 | HEART RATE: 96 BPM | TEMPERATURE: 97.8 F | OXYGEN SATURATION: 99 % | DIASTOLIC BLOOD PRESSURE: 78 MMHG

## 2025-08-23 DIAGNOSIS — F17.210 CIGARETTE NICOTINE DEPENDENCE WITHOUT COMPLICATION: ICD-10-CM

## 2025-08-23 DIAGNOSIS — J01.40 ACUTE NON-RECURRENT PANSINUSITIS: Primary | ICD-10-CM

## 2025-08-23 PROCEDURE — 99213 OFFICE O/P EST LOW 20 MIN: CPT | Performed by: NURSE PRACTITIONER

## 2025-08-23 RX ORDER — NICOTINE 21 MG/24HR
1 PATCH, TRANSDERMAL 24 HOURS TRANSDERMAL EVERY 24 HOURS
Qty: 28 EACH | Refills: 1 | Status: SHIPPED | OUTPATIENT
Start: 2025-08-23

## 2025-08-23 RX ORDER — BROMPHENIRAMINE MALEATE, PSEUDOEPHEDRINE HYDROCHLORIDE, AND DEXTROMETHORPHAN HYDROBROMIDE 2; 30; 10 MG/5ML; MG/5ML; MG/5ML
5-10 SYRUP ORAL 4 TIMES DAILY PRN
Qty: 220 ML | Refills: 0 | Status: SHIPPED | OUTPATIENT
Start: 2025-08-23

## 2025-08-23 RX ORDER — AZITHROMYCIN 250 MG/1
TABLET, FILM COATED ORAL
Qty: 6 TABLET | Refills: 0 | Status: SHIPPED | OUTPATIENT
Start: 2025-08-23

## 2025-08-25 ENCOUNTER — OFFICE VISIT (OUTPATIENT)
Age: 35
End: 2025-08-25
Payer: COMMERCIAL

## 2025-08-25 VITALS
OXYGEN SATURATION: 97 % | HEIGHT: 63 IN | HEART RATE: 68 BPM | BODY MASS INDEX: 31.01 KG/M2 | SYSTOLIC BLOOD PRESSURE: 118 MMHG | WEIGHT: 175 LBS | DIASTOLIC BLOOD PRESSURE: 62 MMHG

## 2025-08-25 DIAGNOSIS — F41.1 GENERALIZED ANXIETY DISORDER: ICD-10-CM

## 2025-08-25 DIAGNOSIS — F43.21 GRIEF: ICD-10-CM

## 2025-08-25 DIAGNOSIS — F51.04 PSYCHOPHYSIOLOGICAL INSOMNIA: ICD-10-CM

## 2025-08-25 DIAGNOSIS — F33.1 MODERATE EPISODE OF RECURRENT MAJOR DEPRESSIVE DISORDER: Primary | ICD-10-CM

## 2025-08-25 PROCEDURE — 99214 OFFICE O/P EST MOD 30 MIN: CPT | Performed by: NURSE PRACTITIONER

## 2025-08-25 RX ORDER — BUSPIRONE HYDROCHLORIDE 10 MG/1
10 TABLET ORAL 2 TIMES DAILY
Qty: 180 TABLET | Refills: 0 | Status: SHIPPED | OUTPATIENT
Start: 2025-08-25 | End: 2025-11-23

## 2025-08-25 RX ORDER — ESCITALOPRAM OXALATE 10 MG/1
10 TABLET ORAL DAILY
Qty: 90 TABLET | Refills: 0 | Status: SHIPPED | OUTPATIENT
Start: 2025-08-25 | End: 2025-11-23